# Patient Record
(demographics unavailable — no encounter records)

---

## 2025-06-03 NOTE — REASON FOR VISIT
[Consultation] : a consultation regarding [Peripheral Vascular Disease] : peripheral vascular disease [FreeTextEntry1] : 31-year-old female presenting with left calf pain.  Upon arrival patient hemodynamically stable.  On exam patient mild left calf tenderness palpation.  Distal pulses intact and extremities warm well-perfused.  Patient denies any shortness of breath or difficulty breathing no evidence of hypoxia.  No pain with inspiration. Stats she uses OCP but not sure what kind. Was on a cruise recently and came back yesterday, flew from [place] which was a 2-3 hr flight. Patient denies prior hx of VTE or fam hx of VTE or coagulopathy. Trop and pro-BNP undetectable but possible RHS on CT. Will start therapuetic lovenox and obtain TTE. Will dc on DOAC if TTE non-concerning.  Recommendations: - therapeutic Lovenox, likely can transition to DOAC if labs not c/f APLS - TTE - Telemetry - Please send hypercoag workup, to be followed up by Vascular Cardiology as outpatient - Likely needs CTV A/P for [facility] eval, can obtain as outpatient eval     Attestation Statements:   Attestation Statements: I have personally seen and examined the patient.  I fully participated in the care of this patient.  I have made amendments to the documentation where necessary, and agree with the history, physical exam, and plan as documented by the Fellow.   Low risk PE Needs hypercoag work-up, work-up for iliocaval compression vascular medicine follow-up outpatient .